# Patient Record
(demographics unavailable — no encounter records)

---

## 2024-10-19 NOTE — HISTORY OF PRESENT ILLNESS
[de-identified] : Patient is a 59-year-old lady is seen today for initial office consultation after an [place] to [place].  She sustained lateral compression her fracture of the pelvis which involve the ramus pubis.  She has been treated with partial weightbearing to full weightbearing.  Currently she is using 1 crutch.  She is still having pain mostly at the groin area and at the ischial area.  She is back to using stationary bike.  She stated that pain is more after long standing and long walking.

## 2024-10-19 NOTE — DISCUSSION/SUMMARY
[de-identified] : Patient was instructed to improve her gait by discontinuing the using of a crutch as needed.  We will also order an MRI of the right hip to evaluate for possible hamstring tear.  Patient will follow-up with me in 6 weeks.  The patient patient questions were answered to satisfaction.

## 2024-10-19 NOTE — PHYSICAL EXAM
[de-identified] : Patient is walking with 1 crutch with a slight antalgic limp.  She is able to stand on 1 leg with holding to the counter.  Trendelenburg test is negative.  There is also tenderness over the ischium at the insertion of the hamstring. [de-identified] : X-ray performed today 1 view of the pelvis, shows status post right THR.  There is callus formation at the inferior pubic ramus.  There is no signs of fracture around the prosthesis.  There is minimal changes related to polyethylene wear.

## 2024-11-12 NOTE — HISTORY OF PRESENT ILLNESS
[de-identified] : h/p lump near collarbone [de-identified] : Patient returns left clavicular lump hasn't changed in size, remains non-tender, without redness or drainage.    Interval Hx 05/14/2024: Patient returns still with lump inner aspect left clavicle.  Patient denies pain, increase in size, redness or drainage. Mammo/Breast US 04/24/2024: BI-RADS 1  Interval Hx 04/16/2024: This 57 yo F with h/o lump she noticed near inner aspect left clavicle.  Patient denies pain, fever, chills, redness or drainage.  Patient states lump can increase and decrease in size.

## 2024-11-12 NOTE — PHYSICAL EXAM
[Alert] : alert [Oriented to Person] : oriented to person [Oriented to Place] : oriented to place [Oriented to Time] : oriented to time [Calm] : calm [de-identified] : well-developed white female in NAD [de-identified] : No cervical adenopathy left, small palpable non-tender mass near left clavicle US: No mass or fluid collection [de-identified] : No Axillary adenopathy Left

## 2025-06-12 NOTE — HISTORY OF PRESENT ILLNESS
[de-identified] : infected cyst of right breast [de-identified] : Pt presents with a new lump on her right breast for the last week. With redness. Without drainage. Pt is scheduled for her routine mammo and sonogram in one week

## 2025-06-12 NOTE — PHYSICAL EXAM
[Normal Heart Sounds] : normal heart sounds [Calm] : calm [de-identified] : well developed white female in no acute distress [de-identified] : 3 by 3 cm cyst of right breast skin, inferior fold.

## 2025-06-24 NOTE — PHYSICAL EXAM
[Normal Breath Sounds] : Normal breath sounds [Normal Heart Sounds] : normal heart sounds [Normal Rate and Rhythm] : normal rate and rhythm [Calm] : calm [de-identified] : well developed female in no distress [de-identified] : incision clean and opened [de-identified] : benign

## 2025-06-24 NOTE — HISTORY OF PRESENT ILLNESS
[de-identified] : infected cyst of right breast, s/p incision and drainage of right breast abscess [de-identified] : Pt doing well, decreased pain and swelling,

## 2025-07-15 NOTE — PHYSICAL EXAM
[Normocephalic] : normocephalic [Atraumatic] : atraumatic [No Supraclavicular Adenopathy] : no supraclavicular adenopathy [Symmetrical] : symmetrical [No dominant masses] : no dominant masses in right breast  [No dominant masses] : no dominant masses left breast [No Nipple Retraction] : no left nipple retraction [No Nipple Discharge] : no left nipple discharge [Breast Mass Right Breast ___cm] : no masses [Breast Mass Left Breast ___cm] : no masses [No Axillary Lymphadenopathy] : no left axillary lymphadenopathy [No Edema] : no edema [No Rashes] : no rashes [No Ulceration] : no ulceration [Breast Nipple Inversion] : nipples not inverted [Breast Nipple Retraction] : nipples not retracted [Breast Nipple Flattening] : nipples not flattened [Breast Nipple Fissures] : nipples not fissured [Breast Abnormal Secretion Bloody Fluid] : no bloody discharge [Breast Abnormal Lactation (Galactorrhea)] : no galactorrhea [Breast Abnormal Secretion Serous Fluid] : no serous discharge [Breast Abnormal Secretion Opalescent Fluid] : no milky discharge [de-identified] : Abscess site Right Breast (sub mammary) clean and closed

## 2025-07-15 NOTE — ADDENDUM
[FreeTextEntry1] : -Now that infection has resolved, will order yearly screening Mammo/Breast US. -Patient instructed to call office to speak to Dr. Garcia if she doesn't hear from him by one week after Mammo/Breast US. -Explain to the patient that abscess likely was due to an infected sebaceous cyst Right Breast. -Patient to follow-up in one to two months, once lump returns to abscess site. I, Dr. Lion Garcia, personally performed the evaluation and management (E/M) services for this established patient who presents today with (a) new problem(s)/exacerbation of (an) existing condition(s). That E/M includes conducting the clinically appropriate interval history &/or exam, assessing all new/exacerbated conditions, and establishing a new plan of care. Today, my PA, Cathy Her, was here to observe my evaluation and management service for this new problem/exacerbated condition and follow the plan of care established by me going forward.

## 2025-07-15 NOTE — HISTORY OF PRESENT ILLNESS
[FreeTextEntry1] : This 61 yo W F with h/o infected cyst of right breast, s/p incision and drainage of right breast abscess on 06/17/2025. The patient denies fever, chills, redness or drainage.  Patient with h/o Breast Augmentation (Silicone Implants)    Last Mammo/Breast US 04/24/2024